# Patient Record
Sex: FEMALE | Race: NATIVE HAWAIIAN OR OTHER PACIFIC ISLANDER
[De-identification: names, ages, dates, MRNs, and addresses within clinical notes are randomized per-mention and may not be internally consistent; named-entity substitution may affect disease eponyms.]

---

## 2018-08-29 ENCOUNTER — HOSPITAL ENCOUNTER (OUTPATIENT)
Dept: HOSPITAL 39 - LAB.NP | Age: 58
End: 2018-08-29
Attending: PHYSICIAN ASSISTANT
Payer: COMMERCIAL

## 2018-08-29 DIAGNOSIS — R30.0: Primary | ICD-10-CM

## 2020-05-05 ENCOUNTER — HOSPITAL ENCOUNTER (OUTPATIENT)
Dept: HOSPITAL 39 - ER | Age: 60
Setting detail: OBSERVATION
Discharge: HOME | End: 2020-05-05
Attending: NURSE PRACTITIONER | Admitting: NURSE PRACTITIONER
Payer: COMMERCIAL

## 2020-05-05 VITALS — OXYGEN SATURATION: 99 % | TEMPERATURE: 98 F | DIASTOLIC BLOOD PRESSURE: 60 MMHG | SYSTOLIC BLOOD PRESSURE: 115 MMHG

## 2020-05-05 DIAGNOSIS — R10.11: ICD-10-CM

## 2020-05-05 DIAGNOSIS — Z90.49: ICD-10-CM

## 2020-05-05 DIAGNOSIS — I36.1: ICD-10-CM

## 2020-05-05 DIAGNOSIS — R10.13: ICD-10-CM

## 2020-05-05 DIAGNOSIS — K82.8: ICD-10-CM

## 2020-05-05 DIAGNOSIS — K82.4: ICD-10-CM

## 2020-05-05 DIAGNOSIS — E87.6: ICD-10-CM

## 2020-05-05 DIAGNOSIS — F41.9: ICD-10-CM

## 2020-05-05 DIAGNOSIS — R11.2: ICD-10-CM

## 2020-05-05 DIAGNOSIS — Z90.710: ICD-10-CM

## 2020-05-05 DIAGNOSIS — Z79.899: ICD-10-CM

## 2020-05-05 DIAGNOSIS — R07.89: Primary | ICD-10-CM

## 2020-05-05 DIAGNOSIS — R00.1: ICD-10-CM

## 2020-05-05 DIAGNOSIS — E78.5: ICD-10-CM

## 2020-05-05 DIAGNOSIS — I34.0: ICD-10-CM

## 2020-05-05 DIAGNOSIS — I10: ICD-10-CM

## 2020-05-05 DIAGNOSIS — K21.9: ICD-10-CM

## 2020-05-05 PROCEDURE — 80053 COMPREHEN METABOLIC PANEL: CPT

## 2020-05-05 PROCEDURE — 96375 TX/PRO/DX INJ NEW DRUG ADDON: CPT

## 2020-05-05 PROCEDURE — 83690 ASSAY OF LIPASE: CPT

## 2020-05-05 PROCEDURE — 82553 CREATINE MB FRACTION: CPT

## 2020-05-05 PROCEDURE — 82550 ASSAY OF CK (CPK): CPT

## 2020-05-05 PROCEDURE — 93005 ELECTROCARDIOGRAM TRACING: CPT

## 2020-05-05 PROCEDURE — 80061 LIPID PANEL: CPT

## 2020-05-05 PROCEDURE — 83880 ASSAY OF NATRIURETIC PEPTIDE: CPT

## 2020-05-05 PROCEDURE — 99285 EMERGENCY DEPT VISIT HI MDM: CPT

## 2020-05-05 PROCEDURE — 83735 ASSAY OF MAGNESIUM: CPT

## 2020-05-05 PROCEDURE — 93306 TTE W/DOPPLER COMPLETE: CPT

## 2020-05-05 PROCEDURE — 84703 CHORIONIC GONADOTROPIN ASSAY: CPT

## 2020-05-05 PROCEDURE — 96361 HYDRATE IV INFUSION ADD-ON: CPT

## 2020-05-05 PROCEDURE — 74177 CT ABD & PELVIS W/CONTRAST: CPT

## 2020-05-05 PROCEDURE — 74019 RADEX ABDOMEN 2 VIEWS: CPT

## 2020-05-05 PROCEDURE — 85610 PROTHROMBIN TIME: CPT

## 2020-05-05 PROCEDURE — 96365 THER/PROPH/DIAG IV INF INIT: CPT

## 2020-05-05 PROCEDURE — 82150 ASSAY OF AMYLASE: CPT

## 2020-05-05 PROCEDURE — 36415 COLL VENOUS BLD VENIPUNCTURE: CPT

## 2020-05-05 PROCEDURE — 76705 ECHO EXAM OF ABDOMEN: CPT

## 2020-05-05 PROCEDURE — 85025 COMPLETE CBC W/AUTO DIFF WBC: CPT

## 2020-05-05 PROCEDURE — 84484 ASSAY OF TROPONIN QUANT: CPT

## 2020-05-05 PROCEDURE — 81001 URINALYSIS AUTO W/SCOPE: CPT

## 2020-05-05 PROCEDURE — 96366 THER/PROPH/DIAG IV INF ADDON: CPT

## 2020-05-05 PROCEDURE — 85730 THROMBOPLASTIN TIME PARTIAL: CPT

## 2020-05-05 PROCEDURE — 85379 FIBRIN DEGRADATION QUANT: CPT

## 2020-05-05 RX ADMIN — ESCITALOPRAM OXALATE SCH MG: 10 TABLET ORAL at 09:18

## 2020-05-05 RX ADMIN — Medication SCH ML: at 09:18

## 2020-05-05 RX ADMIN — Medication SCH ML: at 09:51

## 2020-05-05 RX ADMIN — ESCITALOPRAM OXALATE SCH: 10 TABLET ORAL at 09:50

## 2020-05-05 NOTE — RAD
Acute abdominal series on 5/5/2020



CLINICAL INDICATION: Severe epigastric pain



COMPARISON: None



FINDINGS:



CHEST: The lungs are clear. Cardiac, hilar and mediastinal

contours are within normal limits. Pulmonary vascularity is

within normal limits.



ABDOMEN: There is no free air. Mild stool is noted throughout the

colon may represent mild constipation. Bowel gas pattern is

nonspecific. No abnormal calcification or mass effect is noted.

Mild degenerative changes are noted in the spine.



IMPRESSION:

1. No acute cardiopulmonary disease.

2. Mild increased stool in the colon that may represent very mild

constipation with an otherwise nonspecific abdomen.



Electronically signed by:  Terrance Greenwood  5/5/2020 1:46 AM CDT

Workstation: [id number]

## 2020-05-05 NOTE — US
EXAM DESCRIPTION: 

Gall Bladder: ULTRASOUND.



CLINICAL HISTORY: 

ruq abd pain



COMPARISON: 

CT scan abdomen and pelvis today.



TECHNIQUE: 

Transabdominal scanning: Gray-scale and Doppler modes.



FINDINGS: 

Gallbladder: Normal size and shape. Echogenic 5.1 mm wall lesion

nonvascular and no shadowing. Not mobile. No stones or sludge. No

fluid around the gallbladder. No wall thickening. 2.1 mm.

Non-tender with transducer pressure.

Common bile duct: caliber 3.7 mm within normal limits. 

Liver:  normal echogenicity; contour liver capsule smooth where

seen. No fluid around the liver. Intrahepatic biliary ducts

normal caliber.  Doppler hepatopedal flow portal vein.. 1.2 cm

diameter at the monique hepatis.  Long axis right lobe 16.7 cm.

Pancreas: Not well visualized. Duct not seen.

Aorta: Proximal caliber 1.4 cm within normal limits. 

Right kidney: long axis 9.2 cm. 12 mm cortical thickness. Normal

cortical echogenicity. No echogenic stones, no hydronephrosis, no

perirenal fluid..



IMPRESSION: 

1. 5 mm polyp in the gallbladder with no stones or sludge. No

wall thickening or fluid. Nontender with transducer pressure.

Normal caliber common bile duct.

2. Pancreas not well seen. Liver is unremarkable except upper

normal limits in size.

3. Right kidney changes are most likely age-related. Normal

caliber of the proximal abdominal aorta.



Electronically signed by:  Ryan Thorpe MD  5/5/2020 11:48 AM CDT

Workstation: [id number]

## 2020-05-05 NOTE — ED.PDOC
History of Present Illness





- General


Chief Complaint: Chest Pain/MI


Stated Complaint: right breast pain, stomach pain


Time Seen by Provider: 05/05/20 00:59


Source: patient


Exam Limitations: no limitations





- History of Present Illness


Initial Comments: 





The patient is a 59-year-old  female presented emergency room with a 

complaint of recent fairly abrupt onset chest pain.  On examination however she 

has fairly significant tenderness to palpation in the epigastric area and in the

left upper quadrant.  She reports that the pain radiates to the right lower 

chest.  She had one episode of vomiting.  No fever.  Pain started about 1 hour 

prior to arrival here.  She was feeling fine prior to that.  She does have a 

history of hypertension but no history of coronary artery disease.


Timing/Duration: 1 hour


Severity: severe


Improving Factors: nothing


Worsening Factors: other - Palpation


Associated Symptoms: malaise, nausea/vomiting


Allergies/Adverse Reactions: 


Allergies





NO KNOWN ALLERGY Allergy (Verified 05/05/20 01:05)


   





Home Medications: 


Ambulatory Orders





Escitalopram [Lexapro] 10 mg PO DAILY 05/05/20 


Lisinopril 10 mg PO DAILY 05/05/20 


Rosuvastatin Calcium 10 mg PO DAILY 05/05/20 











Review of Systems





- Review of Systems


Constitutional: States: malaise


EENTM: States: no symptoms reported


Respiratory: States: no symptoms reported


Cardiology: States: chest pain


Gastrointestinal/Abdominal: States: abdominal pain, nausea, vomiting


Genitourinary: States: no symptoms reported


Musculoskeletal: States: no symptoms reported


Skin: States: no symptoms reported


Neurological: States: no symptoms reported


Endocrine: States: no symptoms reported


All other Systems: No Change from Baseline





Past Medical History (General)





- Patient Medical History


Hx Seizures: No


Hx Stroke: No


Hx Dementia: No


Hx Asthma: No


Hx of COPD: No


Hx Cardiac Disorders: No


Hx Congestive Heart Failure: No


Hx Pacemaker: No


Hx Hypertension: Yes


Hx Thyroid Disease: No


Hx Diabetes: No


Hx Gastroesophageal Reflux: Yes


Hx Renal Disease: No


Hx Cancer: No


Hx Hepatitis C: No


Surgical History: appendectomy, Hysterectomy





- Vaccination History


Hx Tetanus, Diphtheria Vaccination: No


Hx Influenza Vaccination: No


Hx Pneumococcal Vaccination: No





- Social History


Hx Alcohol Use: Yes





Family Medical History





- Family History


  ** Mother


Family History: Unknown





Physical Exam





- Physical Exam


General Appearance: Alert, Anxious, Obvious distress


Eye Exam: bilateral normal


Ears, Nose, Throat: hearing grossly normal, normal ENT inspection, normal phary

nx


Neck: full range of motion, supple


Respiratory: lungs clear, normal breath sounds, no respiratory distress, no 

accessory muscle use


Cardiovascular/Chest: normal peripheral pulses, regular rate, rhythm, no edema, 

bradycardia


Peripheral Pulses: radial,right: 2+, radial,left: 2+, dorsalis pedis,right: 2+, 

dorsalis pedis,left: 2+


Gastrointestinal/Abdominal: soft, other - See history of present illness.  No 

palpable mass.


Rectal Exam: deferred


Back Exam: no CVA tenderness, no vertebral tenderness


Extremity: normal range of motion, non-tender, normal inspection, no pedal 

edema, normal capillary refill


Neurologic: CNs II-XII nml as tested, alert, normal mood/affect, oriented x 3


Skin Exam: normal color


Comments: 





                               Vital Signs - 24 hr











  05/05/20 05/05/20 05/05/20





  00:56 01:00 01:37


 


Temperature 97.2 F L  


 


Pulse Rate   


 


Pulse Rate [ 50 L 49 L 49 L





monitor]   


 


Respiratory 19 20 





Rate   


 


Blood Pressure 178/151 152/84 121/76





[Left Arm]   


 


O2 Sat by Pulse 100 99 





Oximetry   














  05/05/20 05/05/20





  02:09 03:00


 


Temperature 97.2 F L 


 


Pulse Rate 60 


 


Pulse Rate [ 60 55 L





monitor]  


 


Respiratory 20 16





Rate  


 


Blood Pressure 117/46 107/61





[Left Arm]  


 


O2 Sat by Pulse 99 99





Oximetry  














Progress





- Progress


Progress: 





05/05/20 03:11


The patient is a 59-year-old  female presented emergency room secondary

to fairly abrupt onset pain in the upper abdomen with a episode of vomiting.  

The patient was feeling radiation of the pain to the right lower chest however 

pain to palpation localized to the epigastric and left upper quadrant.  CT scan 

shows a mildly distended gallbladder but no evidence of any stones or wall 

thickening.  There is no evidence of increased liver function tests or increased

amylase or lipase.  She does have moderate stool indicating constipation.  She 

has received a dose of milk of magnesia.  The patient received 2 of morphine 

which helped the pain significantly and the patient is resting comfortably at 

the moment.  Initial cardiac enzymes are negative and the EKG shows mild sinus 

bradycardia.  There is a half a millimeter of elevation of the ST segment in 

leads II, III and aVF on the EKGs which are not changing.  I believe this is 

simply going to be her baseline not any evidence of ischemia.  That being said 

repeating cardiac enzymes should be done.  Based on the presentation however I 

believe the most likely source of her discomfort is either a significant 

gastritis or duodenitis or early gallbladder disease.  The patient has received 

acid reducing medications.  Additionally I am going to place the patient on 

Zosyn for now.  She has received a dose of Zofran to control any nausea.  

Additionally she has some mild hypokalemia and has received a dose of oral 

potassium.  The patient will have a set of cardiac enzymes repeated in about an 

hour.  If those are negative we will contact the on-call internist for admission

overnight to continue the cardiac rule out and hopefully obtain a right upper 

quadrant ultrasound in the morning.


05/05/20 05:33


Repeat cardiac enzymes are within normal limits.  Repeat abdominal exam shows 

the pain is still essentially in the epigastric area.  This is looking less and 

less like a cardiac source.  Most likely this is gastritis possibly complicated 

by constipation.  The patient will be admitted for completing the cardiac rule 

out and to obtain a right upper quadrant ultrasound in the morning.  She will 

likely need to be redosed with intermittent pain medications as well.  Admit for

continued care and monitoring.  Vital signs have remained stable.  The patient 

does have a stable sinus bradycardia.





- Results/Orders


Results/Orders: 


Initial EKG shows sinus bradycardia 57 bpm.  Repeat is 48 bpm.  Normal R wave 

progression.  Mild right axis deviation.  Normal QT interval.  Possibly half a 

millimeter of ST elevation in 2 3 and aVF on both EKGs.  No changes from when 

the patient is having significant pain to when she is sleeping.





Initial abdominal series shows constipation.  No widening of the mediastinum.  

No obvious pulmonary pathology.  No free air.  No evidence of obstruction.





CT scan of the abdomen pelvis with contrast shows mild hepatomegaly and a 

distended gallbladder but no evidence of any stones or gallbladder wall 

thickening.  Moderate stool.  No obstruction.  See report for details.


                                Laboratory Tests











  05/05/20 05/05/20 05/05/20





  01:03 01:03 01:03


 


WBC   7.8 


 


RBC   4.65 


 


Hgb   13.9 


 


Hct   41.4 


 


MCV   89.0 


 


MCH   29.9 


 


MCHC   33.6 


 


RDW   13.8 


 


Plt Count   248 


 


MPV   7.3 L 


 


Absolute Neuts (auto)   3.70 


 


Absolute Lymphs (auto)   3.10 


 


Absolute Monos (auto)   0.80 


 


Absolute Eos (auto)   0.20 


 


Absolute Basos (auto)   0.10 


 


Neutrophils %   47.5 


 


Lymphocytes %   39.6 


 


Monocytes %   10.2 H 


 


Eosinophils %   2.0 


 


Basophils %   0.7 


 


PT    9.3


 


INR    < 1.00


 


PTT (SP)    23.3


 


D-Dimer, Quantitative    < 131 L


 


Sodium  140  


 


Potassium  3.1 L  


 


Chloride  105  


 


Carbon Dioxide  27  


 


Anion Gap  11.1 L  


 


BUN  18  


 


Creatinine  1.04  


 


BUN/Creatinine Ratio  17.3  


 


Random Glucose  119 H  


 


Serum Osmolality  282.4  


 


Calcium  9.3  


 


Magnesium  2.1  


 


Total Bilirubin  0.6  


 


AST  49 H  


 


ALT  29  


 


Alkaline Phosphatase  67  


 


Creatine Kinase  170 H  


 


CK-MB (CK-2)  2.3  


 


CK-MB (CK-2) %  Not Reportable  


 


Troponin I  < 0.02  


 


B-Natriuretic Peptide  41.8  


 


Serum Total Protein  7.3  


 


Albumin  4.3  


 


Globulin  3.0  


 


Albumin/Globulin Ratio  1.4  


 


Amylase  64  


 


Lipase  43  


 


Serum HCG, Qual   














  05/05/20





  01:03


 


WBC 


 


RBC 


 


Hgb 


 


Hct 


 


MCV 


 


MCH 


 


MCHC 


 


RDW 


 


Plt Count 


 


MPV 


 


Absolute Neuts (auto) 


 


Absolute Lymphs (auto) 


 


Absolute Monos (auto) 


 


Absolute Eos (auto) 


 


Absolute Basos (auto) 


 


Neutrophils % 


 


Lymphocytes % 


 


Monocytes % 


 


Eosinophils % 


 


Basophils % 


 


PT 


 


INR 


 


PTT (SP) 


 


D-Dimer, Quantitative 


 


Sodium 


 


Potassium 


 


Chloride 


 


Carbon Dioxide 


 


Anion Gap 


 


BUN 


 


Creatinine 


 


BUN/Creatinine Ratio 


 


Random Glucose 


 


Serum Osmolality 


 


Calcium 


 


Magnesium 


 


Total Bilirubin 


 


AST 


 


ALT 


 


Alkaline Phosphatase 


 


Creatine Kinase 


 


CK-MB (CK-2) 


 


CK-MB (CK-2) % 


 


Troponin I 


 


B-Natriuretic Peptide 


 


Serum Total Protein 


 


Albumin 


 


Globulin 


 


Albumin/Globulin Ratio 


 


Amylase 


 


Lipase 


 


Serum HCG, Qual  Negative














Departure





- Departure


Clinical Impression: 


 Hypokalemia, Uncontrolled pain





Abdominal pain


Qualifiers:


 Abdominal location: epigastric Qualified Code(s): R10.13 - Epigastric pain





Disposition: Admit Patient


Departure Forms:  ED Discharge - Pt. Copy, Patient Portal Self Enrollment


Instructions:  DI for Chest Pain


Referrals: 


Cuco Trujillo MD [Primary Care Provider] - 1-2 Weeks


Home Medications: 


Ambulatory Orders





Escitalopram [Lexapro] 10 mg PO DAILY 05/05/20 


Lisinopril 10 mg PO DAILY 05/05/20 


Rosuvastatin Calcium 10 mg PO DAILY 05/05/20 











Decision To Admit





- Decistion To Admit


Decision to Admit Reason: Medical Nature


Decision to Admit Date: 05/05/20


Decision to Admit Time: 05:36

## 2020-05-05 NOTE — CT
PROCEDURE: 

CT ABDOMEN PELVIS WITH IV CONTRAST



CLINICAL HISTORY: 

severe epigastric pain  



TECHNIQUE: 

Contiguous axial images obtained through the abdomen and pelvis

following the uneventful administration of IV contrast. Coronal

and sagittal reformatted images were provided.  



This exam was performed according to our departmental

dose-optimization program, which includes automated exposure

control, adjustment of the mA and/or kV according to patient size

and/or use of iterative reconstruction technique.



COMPARISON:  

None available for comparison.



FINDINGS:

Lung bases: Clear

Liver: The liver is enlarged.

Gallbladder and biliary system: The gallbladder is distended. No

calcified gallstones or gallbladder wall thickening.

Pancreas: Unremarkable

Spleen: Unremarkable

Adrenals: Unremarkable

Kidneys: Normal renal cortical enhancement. Excreted contrast

within the renal collecting systems bilaterally. No

hydronephrosis.

Bowel: Intramural fat within portions of the small and large

bowel which can be seen in the setting of prior inflammation.

Moderate stool. Colonic diverticula without adjacent inflammatory

change. No obstruction. No appreciable mucosal thickening.

Appendix: The appendix is not definitively visualized. Pericecal

suture material/clips suggestive of prior appendectomy. No

findings to suggest acute appendicitis.

Urinary bladder: The urinary bladder is partially decompressed.

Reproductive: There has been a hysterectomy. No adnexal cysts or

masses are identified. 

Lymph nodes: No pathologically enlarged lymph nodes.

Peritoneum: No focal fluid collection. No free air.

Vessels: Mild atherosclerotic disease. No abdominal aortic

aneurysm.

Abdominal wall: Small fat-containing umbilical hernia.

Bones: Mild multilevel spondylosis. No acute fracture.



IMPRESSION: 

1.  Distended gallbladder. No calcified gallstones or gallbladder

wall thickening.

2.  Moderate stool. No bowel obstruction.

3.  Other findings as above.



Electronically signed by:  Concha Jara MD  5/5/2020 2:38 AM CDT

Workstation: [id number]

## 2020-05-06 NOTE — SSS
SUPERVISING PHYSICIAN:  Edson Elise MD



DATE OF ADMISSION:   05/05/20

DATE OF DISCHARGE:   05/05/20



DISCHARGE DIAGNOSIS:

1.  Chest pain. Acute coronary syndrome was ruled out with negative troponins 
and

     no changes on EKG.

2.  Right upper quadrant abdominal pain with questionable early cholecystitis.

3.  Gastroesophageal reflux disease.

4.  Hypertension.

5.  Hyperlipidemia.



HISTORY OF PRESENT ILLNESS:  This is a 59-year-old female patient that came to 
the Emergency Room on the date of admission due to sudden onset of chest pain.  
She had been working out to [place] at home for about 3 weeks and after her 
workout on the evening prior to admission, she just felt poorly.  She actually 
threw up her supper.  She could not get comfortable in bed and she got up and 
went to her chair.  Her epigastric area hurt that radiated through to her back. 
She does have a history in the past of GERD, but has not had too many issues 
with that lately.  It was constant pain and later that evening, it actually 
radiated up over to her right side.  She felt like she had "pulled a muscle."  
She does have a history of esophageal strictures that have been stretched.  She 
was diaphoretic and she had some shortness of breath.  In the Emergency Room, 
her initial vital signs showed a temperature of 97.2, heart rate 50, blood 
pressure 152/84.  Her respiratory rate was 20, O2 saturation 99% on room air.  
She received some Zofran and some nitroglycerin.  She continued to have pain and
finally she received some morphine.  That did alleviate her pain.  Lab studies 
showed a CBC that was unremarkable.  Her D-dimer was less than 131.  Her 
potassium was slightly low at 3.1.  The remainder of her electrolytes were 
within normal limits.  Her AST was slightly high at 49, creatinine kinase 170, 
but her troponins were less than 0.02.  Two hours later, her troponin was also 
less than 0.02.  The patient was placed in observation to rule out acute 
coronary syndrome.  It is also to be noted that her abdominal x-ray showed an 
increase in stool and her abdomen/pelvis CT showed 1) Distended gallbladder, no 
calcifications, gallstones or gallbladder wall thickening.  2) Moderate stool.  
No bowel obstruction.  



PAST MEDICAL HISTORY:  

1.  Hyperlipidemia.

2.  Hypertension.

3.  Anxiety.

4.  Gastroesophageal reflux disease.



PAST SURGICAL HISTORY:  

1.  Esophageal strictures that have been dilated.

2.  Appendectomy.

3.  Hysterectomy with bilateral salpingo-oophorectomy.



OUTPATIENT MEDICATIONS:  

1.  Lisinopril.

2.  Lexapro.

3.  Crestor.



ALLERGIES:  NO KNOWN DRUG ALLERGIES.



FAMILY HISTORY:  Positive for chronic obstructive pulmonary disease, dementia, 
cerebrovascular accident.



SOCIAL HISTORY:  She is .  She lives in [place].  She denies any tobacco, 
ETOH or illicit drug use.



REVIEW OF SYSTEMS: Negative except as per history of present illness.



PHYSICAL EXAMINATION: 



VITAL SIGNS:  Temperature 98, heart rate 56, blood pressure 115/60, respiratory 
rate 18, O2 saturation 99% on room air.  



GENERAL:  This is a 59-year-old female patient lying in her hospital bed.  She 
is in no acute distress.



HEENT:  Normocephalic, atraumatic.  Pupils are equal and reactive.  Oropharynx 
is clear.  



NECK:  Supple without mass. 



RESPIRATORY: Essentially clear to auscultation bilaterally.  



CARDIOVASCULAR:  Regular rate and rhythm.  



GASTROINTESTINAL: Abdomen is soft.  It is mildly tender in the epigastric and 
right upper quadrant.  There is no rebound tenderness or guarding. Bowel sounds 
are positive.  



EXTREMITIES:  No cyanosis, clubbing or edema.  



NEUROLOGIC: Awake, alert and oriented times three.  Cranial nerves II-XII are 
grossly intact as tested.



SKIN:  Warm and dry.



LABORATORY:  Her followup serial cardiac enzymes were all negative.  Her 
triglycerides were 153, LDL 98.9, HDL 42.  Amylase 64, lipase 43.  Urinalysis 
was unremarkable.  Gallbladder ultrasound showed 1) 5 mm polyp in the 
gallbladder with no stones or sludge, no wall thickening or fluid, nontender 
with transducer pressure, normal caliber common bile duct.  2) Pancrease not 
well seen.  Liver unremarkable except upper normal limits in size.  3) Right 
kidney changes are most likely age related.  Normal caliber of proximal 
abdominal aorta.  Echocardiogram showed 1) Normal left ventricular size and 
function.  2) Left ventricular ejection fraction estimated by 2D at 55-60%.  



HOSPITAL COURSE:  The patient had no further complaints of chest pain.  Her 
right upper quadrant abdomen continued to have some pain off and on.  We 
discussed the possibility that she may need her gallbladder taken out at some 
point and agreed to see Dr. Ovalle and we reviewed her testing and he has 
requested that she have a followup appointment in his office after discharge.  



DISCHARGE PLAN:  The patient will be discharged home in stable condition.  She 
is to resume her previous diet and activity and continue her home medications as
previously ordered.  I have recommended she take a baby aspirin daily until she 
sees Dr. Trujillo for followup.  At that time, he can review her echocardiogram as 
well as possibly schedule her for a stress test and any additional cardiac 
workup he deems necessary.  She does have an appointment with Dr. Trujillo on 
05/11/20 at 2:00 PM.  The patient has been instructed that is a telemedicine 
appointment.  We were unable to get an appointment with Dr. Ovalle at this 
time, so that followup appointment needs to be verified.  She is to return to 
the hospital or followup with Dr. Trujillo for any problems or complications.



DISCHARGE MEDICATIONS:

1.  Rosuvastatin.

2.  Lisinopril.

3.  Lexapro.

4.  Aspirin.



#[id number]

MTDD

## 2020-06-04 ENCOUNTER — HOSPITAL ENCOUNTER (OUTPATIENT)
Dept: HOSPITAL 39 - AMB | Age: 60
Discharge: HOME | End: 2020-06-04
Attending: SPECIALIST
Payer: COMMERCIAL

## 2020-06-04 VITALS — SYSTOLIC BLOOD PRESSURE: 111 MMHG | TEMPERATURE: 97 F | DIASTOLIC BLOOD PRESSURE: 40 MMHG

## 2020-06-04 VITALS — OXYGEN SATURATION: 97 %

## 2020-06-04 DIAGNOSIS — E78.00: ICD-10-CM

## 2020-06-04 DIAGNOSIS — K59.00: ICD-10-CM

## 2020-06-04 DIAGNOSIS — E66.9: ICD-10-CM

## 2020-06-04 DIAGNOSIS — K80.10: Primary | ICD-10-CM

## 2020-06-04 DIAGNOSIS — I10: ICD-10-CM

## 2020-06-04 DIAGNOSIS — Z79.82: ICD-10-CM

## 2020-06-04 DIAGNOSIS — Z79.899: ICD-10-CM

## 2020-06-04 PROCEDURE — 47562 LAPAROSCOPIC CHOLECYSTECTOMY: CPT

## 2020-06-04 PROCEDURE — 00790 ANES IPER UPR ABD NOS: CPT

## 2020-06-04 NOTE — OP
DATE OF PROCEDURE:  06/04/20



PREOPERATIVE DIAGNOSIS: 

1.  Symptomatic cholelithiasis.



POSTOPERATIVE DIAGNOSIS: 

1.  Symptomatic cholelithiasis.



PROCEDURE: 

1.  Laparoscopic cholecystectomy.



SURGEON:  Cuco Ovalle MD.



ANESTHESIA:  General and local.



FINDINGS:  There was normal anatomy with mild evidence of chronic scarring.



COMPLICATIONS:  None.



ESTIMATED BLOOD LOSS:  Minimal.  



SPECIMEN:  Gallbladder.



CONDITION:  Stable.



PLAN:  Discharge.



INDICATION:  As stated.



PROCEDURE:  General anesthesia was induced.  The patient was prepped and draped 
in sterile fashion.  Marcaine 0.5% with epinephrine was used at all incision 
sites.  While maintaining upward traction, a nick was made near the base of the 
umbilicus.  Veress needle was introduced.  There was free flow of fluid into the
peritoneal cavity which was insufflated to an appropriate level with CO2 gas.  
The 5 mm trocar was placed followed by the camera.  There was no evidence of 
bleeding or bowel injury.  The patient was positioned and subxiphoid and lateral
ports were placed under direct visualization.  The gallbladder fundus was easily
identified.  It was grasped and retracted superiorly and laterally.  There were 
a few anterior adhesions that were taken down until the infundibulum was 
identified.  It was grasped.  The duct and artery were then dissected out 
completely and clearly visualized.  Each was triply ligated.  The gallbladder 
was then dissected off the fossa in total and removed in the EndoCatch bag.  The
fossa was examined.  It remained hemostatic.  The clips were intact.  There was 
no bleeding or bile leakage.  The subxiphoid fascia was then closed with 0 
Vicryl using the suture passer.  It was airtight and non-bleeding.  The 
remaining trocars were removed.  There was no bleeding from the trocar sites.  
The wounds were then closed with Monocryl.  Dressings were applied.  The patient
was awakened and taken to Recovery in stable condition to be discharged.



#[id number]

[facility]